# Patient Record
Sex: FEMALE | Race: ASIAN | Employment: STUDENT | URBAN - METROPOLITAN AREA
[De-identification: names, ages, dates, MRNs, and addresses within clinical notes are randomized per-mention and may not be internally consistent; named-entity substitution may affect disease eponyms.]

---
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
## 2024-01-12 DIAGNOSIS — F32.0 CURRENT MILD EPISODE OF MAJOR DEPRESSIVE DISORDER WITHOUT PRIOR EPISODE (HCC): ICD-10-CM

## 2024-01-12 DIAGNOSIS — F41.1 GAD (GENERALIZED ANXIETY DISORDER): ICD-10-CM

## 2024-01-12 NOTE — TELEPHONE ENCOUNTER
Requested Prescriptions     Pending Prescriptions Disp Refills    ESCITALOPRAM 10 MG Oral Tab [Pharmacy Med Name: ESCITALOPRAM 10MG TABLETS] 90 tablet 0     Sig: TAKE 1 TABLET(10 MG) BY MOUTH DAILY    ESCITALOPRAM 5 MG Oral Tab [Pharmacy Med Name: ESCITALOPRAM 5MG TABLETS] 30 tablet 0     Sig: TAKE 1 TABLET(5 MG) BY MOUTH DAILY     Last refill 12/18/23 #30 for 5mg tabs  Escitalopram 10mg discontinued on 10/30/2023    LOV 8/10/23  Per LOV note:  2. Current mild episode of major depressive disorder without prior episode (HCC)  - denies SI/HI  - doing well on current med  - will cont escitalopram 10mg po daily, refills for 3mo provided  - monitor symptoms  - escitalopram 10 MG Oral Tab; Take 1 tablet (10 mg total) by mouth daily.  Dispense: 90 tablet; Refill: 0         No future appointments.    [facility] sent to patient clarifying which dose she has been on.

## 2024-01-15 NOTE — TELEPHONE ENCOUNTER
Name from pharmacy: ESCITALOPRAM 5MG TABLETS         Will file in chart as: ESCITALOPRAM 5 MG Oral Tab    Sig: TAKE 1 TABLET(5 MG) BY MOUTH DAILY    Disp: 30 tablet    Refills: 0 (Pharmacy requested: Not specified)    Start: 1/12/2024    Class: Normal     LOV  8/10/23     LAST LAB n/a     LAST RX 12/18/23 30    Next OV No future appointments.      PROTOCOL none

## 2024-01-18 RX ORDER — ESCITALOPRAM OXALATE 5 MG/1
5 TABLET ORAL DAILY
Qty: 30 TABLET | Refills: 0 | OUTPATIENT
Start: 2024-01-18

## 2024-01-18 RX ORDER — ESCITALOPRAM OXALATE 10 MG/1
10 TABLET ORAL DAILY
Qty: 90 TABLET | Refills: 0 | Status: SHIPPED | OUTPATIENT
Start: 2024-01-18

## 2024-01-26 ENCOUNTER — PATIENT MESSAGE (OUTPATIENT)
Dept: FAMILY MEDICINE CLINIC | Facility: CLINIC | Age: 19
End: 2024-01-26

## 2024-01-26 NOTE — TELEPHONE ENCOUNTER
From: Yaw Ramos  To: Penelope Aguilar  Sent: 1/26/2024 8:24 AM CST  Subject: Switching Primary     Good morning! Yaw is switching provider from Dr Reese to aicha. Thank you    Ana

## 2024-02-22 DIAGNOSIS — F41.1 GAD (GENERALIZED ANXIETY DISORDER): ICD-10-CM

## 2024-02-22 RX ORDER — DULOXETIN HYDROCHLORIDE 60 MG/1
60 CAPSULE, DELAYED RELEASE ORAL DAILY
Qty: 30 CAPSULE | Refills: 0 | Status: SHIPPED | OUTPATIENT
Start: 2024-02-22

## 2024-02-22 NOTE — TELEPHONE ENCOUNTER
LOV 1/26/2024     LAST LAB     LAST RX   DULoxetine 60 MG Oral Cap DR Particles 30 capsule 0 1/26/2024 --   Sig:   Take 1 capsule (60 mg total) by mouth daily.         Next OV No future appointments.     PROTOCOL     Psychiatric Non-Scheduled (Anti-Anxiety) [id number]/22/2024 12:42 PM   Protocol Details In person appointment or virtual visit in the past 6 mos or appointment in next 3 mos    Depression Screening completed within the past 12 months

## 2024-02-22 NOTE — TELEPHONE ENCOUNTER
Return in about 6 weeks (around 3/8/2024) for F/U Mental Health. [facility] sent

## 2024-04-22 DIAGNOSIS — F41.1 GAD (GENERALIZED ANXIETY DISORDER): ICD-10-CM

## 2024-04-22 DIAGNOSIS — F32.0 CURRENT MILD EPISODE OF MAJOR DEPRESSIVE DISORDER WITHOUT PRIOR EPISODE (HCC): ICD-10-CM

## 2024-04-23 RX ORDER — ESCITALOPRAM OXALATE 10 MG/1
10 TABLET ORAL DAILY
Qty: 90 TABLET | Refills: 0 | OUTPATIENT
Start: 2024-04-23

## 2024-04-23 RX ORDER — HYDROXYZINE HYDROCHLORIDE 25 MG/1
25 TABLET, FILM COATED ORAL AS NEEDED
Qty: 30 TABLET | Refills: 1 | Status: SHIPPED | OUTPATIENT
Start: 2024-04-23

## 2024-04-23 NOTE — TELEPHONE ENCOUNTER
LOV 3/5/2024     LAST LAB    LAST RX     Next OV No future appointments.     PROTOCOL     Psychiatric Non-Scheduled (Anti-Anxiety) [id number]/22/2024 05:56 AM   Protocol Details In person appointment or virtual visit in the past 6 mos or appointment in next 3 mos    Depression Screening completed within the past 12 months

## 2024-04-23 NOTE — TELEPHONE ENCOUNTER
LOV 3/5/2024     LAST LAB    LAST RX   Medication Quantity Refills Start End   hydrOXYzine 25 MG Oral Tab 30 tablet 1 1/26/2024 --   Sig:   Take 1 tablet (25 mg total) by mouth as needed for Anxiety.     Route:   Oral         Next OV No future appointments.     PROTOCOL NONE

## 2024-04-23 NOTE — TELEPHONE ENCOUNTER
Unclear why request is coming to us. I dont have any notes regarding change in her medications. May need to request through Psych

## 2024-06-04 ENCOUNTER — APPOINTMENT (OUTPATIENT)
Dept: GENERAL RADIOLOGY | Age: 19
End: 2024-06-04
Attending: EMERGENCY MEDICINE
Payer: COMMERCIAL

## 2024-06-04 ENCOUNTER — HOSPITAL ENCOUNTER (OUTPATIENT)
Age: 19
Discharge: HOME | End: 2024-06-04
Attending: EMERGENCY MEDICINE
Payer: COMMERCIAL

## 2024-06-04 VITALS
DIASTOLIC BLOOD PRESSURE: 80 MMHG | OXYGEN SATURATION: 99 % | HEIGHT: 64 IN | WEIGHT: 118 LBS | BODY MASS INDEX: 20.14 KG/M2 | RESPIRATION RATE: 18 BRPM | HEART RATE: 110 BPM | SYSTOLIC BLOOD PRESSURE: 123 MMHG | TEMPERATURE: 99 F

## 2024-06-04 DIAGNOSIS — S56.911A STRAIN OF RIGHT FOREARM, INITIAL ENCOUNTER: Primary | ICD-10-CM

## 2024-06-04 PROCEDURE — 99213 OFFICE O/P EST LOW 20 MIN: CPT

## 2024-06-04 PROCEDURE — 99214 OFFICE O/P EST MOD 30 MIN: CPT

## 2024-06-04 PROCEDURE — 73080 X-RAY EXAM OF ELBOW: CPT | Performed by: EMERGENCY MEDICINE

## 2024-06-04 PROCEDURE — 73090 X-RAY EXAM OF FOREARM: CPT | Performed by: EMERGENCY MEDICINE

## 2024-06-04 NOTE — DISCHARGE INSTRUCTIONS
Follow-up with your orthopedic doctor in the next 1 to 2 weeks if pain persist.  Return for any concerning symptoms.

## 2024-06-04 NOTE — ED PROVIDER NOTES
Patient Seen in: [facility]      History     Chief Complaint   Patient presents with    Arm or Hand Injury     Stated Complaint: fell on arm yesterday    Subjective:   HPI    18-year-old female presents today for evaluation following a fall.  In the evening, patient was walking and tripped over her dog.  She fell on her hyperextended right arm.  This morning, she noted a right proximal forearm pain.  She denies any head injury or other pain.    Objective:   Past Medical History:    Anxiety    Asthma (HCC)    Depression    Stress fracture    right foot              History reviewed. No pertinent surgical history.             Social History     Socioeconomic History    Marital status: Single   Tobacco Use    Smoking status: Never    Smokeless tobacco: Never   Vaping Use    Vaping status: Former   Substance and Sexual Activity    Alcohol use: Yes     Alcohol/week: 2.0 standard drinks of alcohol     Types: 2 Standard drinks or equivalent per week    Drug use: Never    Sexual activity: Yes     Partners: Male     Birth control/protection: Condom   Other Topics Concern    Caffeine Concern Yes     Comment: coffee -  one cup per week    Exercise Yes     Comment: walking - 30mi per day    Seat Belt Yes              Review of Systems    Positive for stated complaint: fell on arm yesterday  Other systems are as noted in HPI.  Constitutional and vital signs reviewed.      All other systems reviewed and negative except as noted above.    Physical Exam     ED Triage Vitals [06/04/24 0841]   /80   Pulse 110   Resp 18   Temp 98.6 °F (37 °C)   Temp src Temporal   SpO2 99 %   O2 Device None (Room air)       Current Vitals:   Vital Signs  BP: 123/80  Pulse: 110  Resp: 18  Temp: 98.6 °F (37 °C)  Temp src: Temporal    Oxygen Therapy  SpO2: 99 %  O2 Device: None (Room air)            Physical Exam  Vitals and nursing note reviewed.   Constitutional:       Appearance: Normal appearance.   HENT:      Head:  Normocephalic.      Nose: Nose normal.      Mouth/Throat:      Mouth: Mucous membranes are moist.   Eyes:      Extraocular Movements: Extraocular movements intact.   Cardiovascular:      Rate and Rhythm: Normal rate.   Pulmonary:      Effort: Pulmonary effort is normal.   Abdominal:      General: Abdomen is flat.   Musculoskeletal:         General: No swelling or deformity. Normal range of motion.      Comments: Palpable right radial pulse.  Normal hand/wrist ulnar, radial and median nerve strength.  Gross sensation intact.  No tenderness over the olecranon.  No deformity   Skin:     General: Skin is warm.   Neurological:      General: No focal deficit present.      Mental Status: She is alert.   Psychiatric:         Mood and Affect: Mood normal.            ED Course   Labs Reviewed - No data to display          XR ELBOW, COMPLETE (MIN 3 VIEWS), RIGHT (CPT=73080)    Result Date: 6/4/2024  PROCEDURE:  XR ELBOW, COMPLETE (MIN 3 VIEWS), RIGHT (CPT=73080)  TECHNIQUE:  Three views were obtained.  COMPARISON:  None.  INDICATIONS:  fell on arm yesterday  PATIENT STATED HISTORY: (As transcribed by Technologist)  Patient c/o right lateral elbow pain since early this AM. Patient stated she tripped over her dog and used right arm to break the fall.    FINDINGS:  No acute fractures or osseous lesions are identified.  Radial capitellar relationship is within normal limits.  No significant elbow joint effusion            CONCLUSION:  No acute fractures.   LOCATION:  [facility]    Dictated by (CST): Paula Ruiz MD on 6/04/2024 at 10:05 AM     Finalized by (CST): Paula Ruiz MD on 6/04/2024 at 10:05 AM       XR FOREARM (2 VIEWS), RIGHT (CPT=73090)    Result Date: 6/4/2024  PROCEDURE:  XR FOREARM (2 VIEWS), RIGHT (CPT=73090)  TECHNIQUE:  Two views were obtained.  COMPARISON:  None.  INDICATIONS:  fell on arm yesterday  PATIENT STATED HISTORY: (As transcribed by Technologist)  Patient c/o right lateral forearm pain since early  this AM. Patient stated she tripped over her dog and used right arm to break the fall.    FINDINGS:  No acute fractures or osseous lesions are identified.  No significant elbow joint effusion.  Radial carpal and Radial capitellar relationships are within normal limits.  No scapholunate widening.             CONCLUSION:  No acute fractures.   LOCATION:  [facility]   Dictated by (CST): Paula Ruiz MD on 6/04/2024 at 10:04 AM     Finalized by (CST): Paula Ruiz MD on 6/04/2024 at 10:04 AM       I personally reviewed the images myself and I agree with the radiology assessment       MDM      Differential Diagnosis  18-year-old female presents today for discomfort over the right proximal forearm.  Her right upper extremity is neurovascularly intact.  Her forearm is soft.  She does have some discomfort over the radial head.  Differential would include strain, contusion or fracture.  Will obtain x-ray and reassess.    10:08 am  X-ray does not reveal acute bony abnormality.  I counseled patient on care for home and advised Ortho follow-up for any persistent pain.  Patient feels comfortable plan, jean DC.                                 Medical Decision Making      Disposition and Plan     Clinical Impression:  1. Strain of right forearm, initial encounter         Disposition:  Discharge  6/4/2024 10:09 am    Follow-up:  Ernie Barahona MD  [address] [place] [id number] [phone]    Call today            Medications Prescribed:  Current Discharge Medication List

## 2024-06-04 NOTE — ED INITIAL ASSESSMENT (HPI)
Patient presents to IC with c/o right elbow/forearm pain after she trip/fell over her dog last night.  Denies head injury.

## 2024-07-16 DIAGNOSIS — F41.1 GAD (GENERALIZED ANXIETY DISORDER): ICD-10-CM

## 2024-07-16 RX ORDER — DULOXETIN HYDROCHLORIDE 60 MG/1
60 CAPSULE, DELAYED RELEASE ORAL DAILY
Qty: 90 CAPSULE | Refills: 0 | Status: CANCELLED | OUTPATIENT
Start: 2024-07-16

## 2024-07-18 ENCOUNTER — PATIENT MESSAGE (OUTPATIENT)
Dept: FAMILY MEDICINE CLINIC | Facility: CLINIC | Age: 19
End: 2024-07-18

## 2024-07-18 DIAGNOSIS — F41.1 GAD (GENERALIZED ANXIETY DISORDER): ICD-10-CM

## 2024-07-18 RX ORDER — DULOXETIN HYDROCHLORIDE 60 MG/1
60 CAPSULE, DELAYED RELEASE ORAL DAILY
Qty: 90 CAPSULE | Refills: 0 | OUTPATIENT
Start: 2024-07-18

## 2024-07-18 RX ORDER — DULOXETIN HYDROCHLORIDE 60 MG/1
60 CAPSULE, DELAYED RELEASE ORAL DAILY
Qty: 30 CAPSULE | Refills: 0 | Status: SHIPPED | OUTPATIENT
Start: 2024-07-18

## 2024-07-18 RX ORDER — HYDROXYZINE HYDROCHLORIDE 25 MG/1
25 TABLET, FILM COATED ORAL AS NEEDED
Qty: 30 TABLET | Refills: 1 | Status: SHIPPED | OUTPATIENT
Start: 2024-07-18

## 2024-07-18 NOTE — TELEPHONE ENCOUNTER
From: Yaw Ramos  To: Penelope Aguilar  Sent: 7/18/2024 9:39 AM CDT  Subject: Prescription     Can u please refill Yaw prescriptions? Ty

## 2024-07-18 NOTE — TELEPHONE ENCOUNTER
Please review.  Protocol failed / Has no protocol.     Requested Prescriptions   Pending Prescriptions Disp Refills    hydrOXYzine 25 MG Oral Tab 30 tablet 1     Sig: Take 1 tablet (25 mg total) by mouth as needed for Anxiety.       There is no refill protocol information for this order        Future Appointments         Provider Department Appt Notes    In 1 week Penelope Aguilar DO [facility], [address] [place] [address] Follow up appt          Recent Outpatient Visits              4 months ago FILOMENA (generalized anxiety disorder)    [facility], [address] [place] [address] Penelope Aguilar,     Office Visit    5 months ago FILOMENA (generalized anxiety disorder)    [facility], [address] [place] [address] Penelope Aguilar,     Office Visit    7 months ago UTI symptoms    [facility], [facility], [address] [place] [address] Carrie Naik PA-C    Office Visit    11 months ago FILOMENA (generalized anxiety disorder)    [facility], [address] [place] [address] Clemencia Cochran,     Office Visit    1 year ago FILOMENA (generalized anxiety disorder)    [facility], [address] [place] [address] Clemencia Cochran, DO    Office Visit

## 2024-07-18 NOTE — TELEPHONE ENCOUNTER
Spoke to pt -requesting Rx Duloxetine .    Future Appointments   Date Time Provider Department Center   7/30/2024  3:00 PM Penelope Aguilar, DO [facility]      [facility] 3/5/2024     Lrx;   Medication Quantity Refills Start End   DULoxetine 60 MG Oral Cap DR Particles 90 capsule 0 3/5/2024 --   Sig:   Take 1 capsule (60 mg total) by mouth daily.        Rx pended Please call patient needs appointment. Please approve if appropriate. Thanks.

## 2024-07-18 NOTE — TELEPHONE ENCOUNTER
Duplicate request, previously addressed.    See encounter 7/18/24 patient message - from proxy Cade Ramos.

## 2024-07-30 ENCOUNTER — OFFICE VISIT (OUTPATIENT)
Dept: FAMILY MEDICINE CLINIC | Facility: CLINIC | Age: 19
End: 2024-07-30
Payer: COMMERCIAL

## 2024-07-30 VITALS
DIASTOLIC BLOOD PRESSURE: 60 MMHG | HEIGHT: 64 IN | OXYGEN SATURATION: 97 % | HEART RATE: 110 BPM | TEMPERATURE: 97 F | BODY MASS INDEX: 19.46 KG/M2 | SYSTOLIC BLOOD PRESSURE: 94 MMHG | RESPIRATION RATE: 16 BRPM | WEIGHT: 114 LBS

## 2024-07-30 DIAGNOSIS — F41.1 GAD (GENERALIZED ANXIETY DISORDER): ICD-10-CM

## 2024-07-30 DIAGNOSIS — Z00.00 ENCOUNTER FOR GENERAL ADULT MEDICAL EXAMINATION WITHOUT ABNORMAL FINDINGS: Primary | ICD-10-CM

## 2024-07-30 PROCEDURE — 99395 PREV VISIT EST AGE 18-39: CPT | Performed by: FAMILY MEDICINE

## 2024-07-30 RX ORDER — DULOXETIN HYDROCHLORIDE 60 MG/1
60 CAPSULE, DELAYED RELEASE ORAL DAILY
Qty: 90 CAPSULE | Refills: 1 | Status: SHIPPED | OUTPATIENT
Start: 2024-07-30

## 2024-07-30 NOTE — PROGRESS NOTES
Family Medicine Progress Note  Assessment & Plan:   Yaw Ramos is a 18 year old female who is here for:   1. Encounter for general adult medical examination without abnormal findings  Wellness Exam done today and routine Preventative Care discussed as noted below.   -Pap smear: - @ 21yo   -G&C testing: declined  -Contraception:  none   -Healthy eating habits and regular exercise encouraged     2. FILOMENA (generalized anxiety disorder)- Patient describes mood as Euthymic.  Meds controlling symptoms based on GAD7.   -Contin Cymbalta 60mg   -CBT/Counseling: weekly   -Aware of importance of adequate sleep, nutrition, and physical activity.   -Aware of ER Precautions for SI/HI.  Ant to Return to clinic if develop worsening symptoms or adverse effects from RX therapy.    - DULoxetine 60 MG Oral Cap DR Particles; Take 1 capsule (60 mg total) by mouth daily.  Dispense: 90 capsule; Refill: 0    Follow-Up: Return in about 6 months (around 1/30/2025) for F/U Mental Health.      Penelope Aguilar,    07/30/24       CC: Anxiety, Depression (Follow up), and Physical    Subjective:    History of Present Illness:  History obtained from patient.     Yaw Ramos is a 18 year old female who presents for Anxiety, Depression (Follow up), and Physical     FILOMENA/MDD- chronic issue since early teens;  has been on Lexapro consistently since at least 18yo.  Last FU with PCM in Aug was on lexapro 10mg.  Was doing well, recently went back to [facility] ([employer]) and had a panic attack which she could not get out of.  When she has episodes of increased anxiety she does have passive SI but no intent or plan.  Was not taking her medication.  Also had not been able to consistently follow-up with therapist when she was away at school. She was then seen in ER and addmited for IP for 2 night 1/20-1/22. Came home this week.  Psychiatrist titrated her down off the Lexparo and started her on Duloxetine with goal of being up to 60mg by next week.  Since starting  the medication she has had some nausea and decreased appetite but overall doing well;  Has  Well[place] in [place].  1/wk as needed.   3/5- Pt is home on spring break; she has since titrated up to the 60mg and doing well.  No AE other than some decreased appetite; has been trying to focus on eating..  Feels her anxiety is under control.  Much more manageable. Hasn't had panic and really hasn't needed the hydroxyzine.   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     ANNUAL PHYSICAL  Menses: Regular without any break through bleeding or concerning symptoms.   LMP: Patient's last menstrual period was 2024.  Concern for STI: Denies   Contraception:  None  Exercise: generally tries to be active-- lifeguarding   Healthy eating habits:  Well-rounded  Tobacco: denies   Alcohol- occ.   Immunizations: up-to-date        FILOMENA-Duloxetine   Asthma   Pshx: None   All:  Fish oil, Peanuts, Tree Nuts. Shellfish   Meds: as below  Fam hx: DM-MGM; HTN-MGM, PGM   Soc hx: no tob/occ etoh/no illicits; [employer]  Ob/gyne: Patient's last menstrual period was 2024.. last pap: -, last mammogram: -,  ,   Colonoscopy: -        [facility] AMB FILOMENA-7 1/26 3/5 7/30   Feeling nervous, anxious, or on edge 3 0 1   Not being able to stop or control worrying 3 1 1   Worrying too much about different things  3 1 1   Trouble relaxing 3 0 0   Being so restless that it's hard to sit still 0 0 0   Becoming easily annoyed or irritable 3 0 0   Feeling afraid as if something awful might happen 3 0 0   FILOMENA 7 Total Score 18 2 3   How difficult has it made it for you to do your work, take care of things at home, get along with others? v [facility]       Depression Screenings (All PHQ) 1/26 3/5 7/30   Little interest or pleasure in doing things 1 1 0   Feeling down, depressed, or  hopeless 1 0 0   Trouble falling/staying asleep, or sleeping too much 3 2 2   Feeling tired or having little energy 3 0 1   Poor appetite or overeating 3 3 1   Feeling bad about yourself 2 1 1   Trouble concentrating  2 1 0   Moving or speaking slowly Or  fidgety/restless  0 0 0   Th you would be better off dead/harming yourself 0 0 0   PHQ-9 TOTAL SCORE 15 8 5   How difficult has it made it for you to do your work, take care of things at home, get along with others? SW sw SW            History/Other:   ROS-Per HPI     Problem List:  Patient Active Problem List   Diagnosis    Allergy to other foods       Current Medications:  Current Outpatient Medications   Medication Sig Dispense Refill    DULoxetine 60 MG Oral Cap DR Particles Take 1 capsule (60 mg total) by mouth daily. 90 capsule 1    hydrOXYzine 25 MG Oral Tab Take 1 tablet (25 mg total) by mouth as needed for Anxiety. 30 tablet 1    Cholecalciferol (VITAMIN D3) 250 MCG (42395 UT) Oral Cap Take by mouth. (Patient not taking: Reported on 7/30/2024)        Past Medical History:  Past Medical History:    Anxiety    Asthma (HCC)    Depression    Stress fracture    right foot      Past Surgical History:  History reviewed. No pertinent surgical history.   Family History:  Family History   Problem Relation Age of Onset    Hypertension Maternal Grandmother     Diabetes Maternal Grandmother     Hypertension Paternal Grandmother     Diabetes Paternal Grandmother     Other (Other) Paternal Grandfather         gout      Social History:  Social History     Socioeconomic History    Marital status: Single   Tobacco Use    Smoking status: Never    Smokeless tobacco: Never   Vaping Use    Vaping status: Former   Substance and Sexual Activity    Alcohol use: Yes     Alcohol/week: 2.0 standard drinks of alcohol     Types: 2 Standard drinks or equivalent per week    Drug use: Never    Sexual activity: Yes     Partners: Male     Birth control/protection: Condom   Other Topics  Concern    Caffeine Concern Yes     Comment: coffee -  one cup per week    Exercise Yes     Comment: walking - 30mi per day    Seat Belt Yes       Allergies:  Allergies   Allergen Reactions    Fish Oil OTHER (SEE COMMENTS)    Peanuts [Peanut Oil] RASH     Unknown, possibly rash    Shellfish-Derived Products SWELLING    Tree Nuts UNKNOWN        Objective:    VITALS: BP 94/60   Pulse 110   Temp 96.7 °F (35.9 °C) (Temporal)   Resp 16   Ht 5' 4\" (1.626 m)   Wt 114 lb (51.7 kg)   LMP 07/23/2024   SpO2 97%   BMI 19.57 kg/m²      BP Readings from Last 3 Encounters:   07/30/24 94/60   06/04/24 123/80   03/05/24 96/60     Wt Readings from Last 3 Encounters:   07/30/24 114 lb (51.7 kg) (22%, Z= -0.76)*   06/04/24 118 lb (53.5 kg) (31%, Z= -0.51)*   03/05/24 114 lb (51.7 kg) (23%, Z= -0.73)*     * Growth percentiles are based on CDC (Girls, 2-20 Years) data.         PHYSICAL EXAM  GEN: pleasant, well-appearing in NAD, AOX3  SKIN: no visible rashes, lesions, or evidence of trauma  HEENT: PERRL, EOMI, moist mucous membranes, oropharynx clear, TM clear, nares patent, no Thyromegaly or nodule.   CV: RRR, no murmurs or abnl heart sounds   PULM: Clear to auscultation, No wheezes, rales, rhonchi.  Non-labored breathing.  ABD: Soft, non-tender, non-distended, + BS, no rigidity/guarding  EXT:  Warm, well perfused, no lower extremity edema  NEURO: CNs grossly intact, no focal weakness  MSK: moves all 4 extremities without difficulty    MENTAL STATUS EXAM  Appearance: groomed appropriately, makes good eye contact  Attitude: cooperative  Motor: No psychomotor agitation/depression   Speech: normal rate and rhythm   Mood: euthymic   Affect:  mood congruent  Form of Thought:  Fluid  Thought Content: No SI/HI   Perception: No hallucinations or delusions  Judgement and Insight- Intact           Penelope Aguilar DO

## 2024-09-19 DIAGNOSIS — F41.1 GAD (GENERALIZED ANXIETY DISORDER): ICD-10-CM

## 2024-09-24 RX ORDER — HYDROXYZINE HYDROCHLORIDE 25 MG/1
25 TABLET, FILM COATED ORAL AS NEEDED
Qty: 30 TABLET | Refills: 1 | Status: SHIPPED | OUTPATIENT
Start: 2024-09-24

## 2024-09-24 NOTE — TELEPHONE ENCOUNTER
Please review. Protocol Failed; No Protocol    Requested Prescriptions   Pending Prescriptions Disp Refills    hydrOXYzine 25 MG Oral Tab 30 tablet 1     Sig: Take 1 tablet (25 mg total) by mouth as needed for Anxiety.       There is no refill protocol information for this order              Recent Outpatient Visits              1 month ago Encounter for general adult medical examination without abnormal findings    [facility], [address] [place] [address] Penelope Aguilar,     Office Visit    6 months ago FILOMENA (generalized anxiety disorder)    [facility], [address] [place] [address] Penelope Aguilar,     Office Visit    8 months ago FILOMENA (generalized anxiety disorder)    [facility], [address] [place] [address] Penelope Aguilar,     Office Visit    10 months ago UTI symptoms    [facility], [facility], [address], [place] Carrie Naik PA-C    Office Visit    1 year ago FILOMENA (generalized anxiety disorder)    [facility], [address] [place] [address] Clemencia Cochran, DO    Office Visit

## 2025-03-07 DIAGNOSIS — F41.1 GAD (GENERALIZED ANXIETY DISORDER): ICD-10-CM

## 2025-03-10 ENCOUNTER — TELEPHONE (OUTPATIENT)
Dept: FAMILY MEDICINE CLINIC | Facility: CLINIC | Age: 20
End: 2025-03-10

## 2025-03-10 ENCOUNTER — OFFICE VISIT (OUTPATIENT)
Dept: FAMILY MEDICINE CLINIC | Facility: CLINIC | Age: 20
End: 2025-03-10
Payer: COMMERCIAL

## 2025-03-10 ENCOUNTER — LAB ENCOUNTER (OUTPATIENT)
Dept: LAB | Age: 20
End: 2025-03-10
Attending: FAMILY MEDICINE
Payer: COMMERCIAL

## 2025-03-10 VITALS
RESPIRATION RATE: 14 BRPM | HEART RATE: 110 BPM | OXYGEN SATURATION: 100 % | WEIGHT: 123 LBS | HEIGHT: 64 IN | DIASTOLIC BLOOD PRESSURE: 60 MMHG | BODY MASS INDEX: 21 KG/M2 | SYSTOLIC BLOOD PRESSURE: 100 MMHG | TEMPERATURE: 98 F

## 2025-03-10 DIAGNOSIS — R50.9 FEVER, UNSPECIFIED FEVER CAUSE: ICD-10-CM

## 2025-03-10 DIAGNOSIS — R50.9 FEVER, UNSPECIFIED FEVER CAUSE: Primary | ICD-10-CM

## 2025-03-10 DIAGNOSIS — F41.1 GAD (GENERALIZED ANXIETY DISORDER): ICD-10-CM

## 2025-03-10 DIAGNOSIS — R59.0 CERVICAL LYMPHADENOPATHY: ICD-10-CM

## 2025-03-10 LAB
ALBUMIN SERPL-MCNC: 4.3 G/DL (ref 3.2–4.8)
ALBUMIN/GLOB SERPL: 1.3 {RATIO} (ref 1–2)
ALP LIVER SERPL-CCNC: 55 U/L
ALT SERPL-CCNC: 14 U/L
ANION GAP SERPL CALC-SCNC: 7 MMOL/L (ref 0–18)
AST SERPL-CCNC: 23 U/L (ref ?–34)
BASOPHILS # BLD AUTO: 0.04 X10(3) UL (ref 0–0.2)
BASOPHILS NFR BLD AUTO: 0.5 %
BILIRUB SERPL-MCNC: 0.2 MG/DL (ref 0.3–1.2)
BUN BLD-MCNC: 6 MG/DL (ref 9–23)
CALCIUM BLD-MCNC: 8.8 MG/DL (ref 8.7–10.6)
CHLORIDE SERPL-SCNC: 103 MMOL/L (ref 98–112)
CO2 SERPL-SCNC: 29 MMOL/L (ref 21–32)
CREAT BLD-MCNC: 0.82 MG/DL
EGFRCR SERPLBLD CKD-EPI 2021: 105 ML/MIN/1.73M2 (ref 60–?)
EOSINOPHIL # BLD AUTO: 0.16 X10(3) UL (ref 0–0.7)
EOSINOPHIL NFR BLD AUTO: 2.1 %
ERYTHROCYTE [DISTWIDTH] IN BLOOD BY AUTOMATED COUNT: 15.9 %
FASTING STATUS PATIENT QL REPORTED: NO
GLOBULIN PLAS-MCNC: 3.2 G/DL (ref 2–3.5)
GLUCOSE BLD-MCNC: 63 MG/DL (ref 70–99)
HCT VFR BLD AUTO: 36.6 %
HETEROPH AB SER QL: NEGATIVE
HGB BLD-MCNC: 11.7 G/DL
IMM GRANULOCYTES # BLD AUTO: 0.02 X10(3) UL (ref 0–1)
IMM GRANULOCYTES NFR BLD: 0.3 %
LYMPHOCYTES # BLD AUTO: 1.17 X10(3) UL (ref 1–4)
LYMPHOCYTES NFR BLD AUTO: 15.3 %
MCH RBC QN AUTO: 25.7 PG (ref 26–34)
MCHC RBC AUTO-ENTMCNC: 32 G/DL (ref 31–37)
MCV RBC AUTO: 80.4 FL
MONOCYTES # BLD AUTO: 0.94 X10(3) UL (ref 0.1–1)
MONOCYTES NFR BLD AUTO: 12.3 %
NEUTROPHILS # BLD AUTO: 5.3 X10 (3) UL (ref 1.5–7.7)
NEUTROPHILS # BLD AUTO: 5.3 X10(3) UL (ref 1.5–7.7)
NEUTROPHILS NFR BLD AUTO: 69.5 %
OSMOLALITY SERPL CALC.SUM OF ELEC: 284 MOSM/KG (ref 275–295)
PLATELET # BLD AUTO: 353 10(3)UL (ref 150–450)
POTASSIUM SERPL-SCNC: 3.5 MMOL/L (ref 3.5–5.1)
PROT SERPL-MCNC: 7.5 G/DL (ref 5.7–8.2)
RBC # BLD AUTO: 4.55 X10(6)UL
SODIUM SERPL-SCNC: 139 MMOL/L (ref 136–145)
WBC # BLD AUTO: 7.6 X10(3) UL (ref 4–11)

## 2025-03-10 PROCEDURE — 86403 PARTICLE AGGLUT ANTBDY SCRN: CPT

## 2025-03-10 PROCEDURE — 36415 COLL VENOUS BLD VENIPUNCTURE: CPT

## 2025-03-10 PROCEDURE — 99214 OFFICE O/P EST MOD 30 MIN: CPT | Performed by: FAMILY MEDICINE

## 2025-03-10 PROCEDURE — 85025 COMPLETE CBC W/AUTO DIFF WBC: CPT

## 2025-03-10 PROCEDURE — 80053 COMPREHEN METABOLIC PANEL: CPT

## 2025-03-10 RX ORDER — DULOXETIN HYDROCHLORIDE 60 MG/1
60 CAPSULE, DELAYED RELEASE ORAL DAILY
Qty: 90 CAPSULE | Refills: 1 | Status: SHIPPED | OUTPATIENT
Start: 2025-03-10

## 2025-03-10 RX ORDER — NORETHINDRONE ACETATE AND ETHINYL ESTRADIOL AND FERROUS FUMARATE 1MG-20(21)
KIT ORAL
COMMUNITY
Start: 2025-02-13

## 2025-03-10 NOTE — PROGRESS NOTES
The following individual(s) verbally consented to be recorded using ambient AI listening technology and understand that they can each withdraw their consent to this listening technology at any point by asking the clinician to turn off or pause the recording:    Patient name: Yaw Ramos   Additional names:

## 2025-03-10 NOTE — TELEPHONE ENCOUNTER
Patient scheduled the following thru MY Chart.  Triage if needed.        Appointment for: Yaw Ramos ([id number])   Visit type: MYCHART EXAM (2964)   3/10/2025 1:20 PM (20 minutes) with Penelope Aguilar in [facility] [address] [place] [address]      Patient comments:   On and off fever

## 2025-03-10 NOTE — PROGRESS NOTES
Family Medicine Progress Note  Assessment & Plan:   Follow-Up: Return if symptoms worsen or fail to improve.        Assessment & Plan  Fever, unspecified fever cause  Persistent symptoms of fever, chills, cough, and congestion for the past month. Possible sinus infection or mononucleosis given the chronicity of symptoms and physical exam findings of nasal congestion, Sinus pressure, throat pain/fatigue.  Lungs are clear.  + Cervical LAD   -Order labs to check for mononucleosis and other potential causes.  -Prescribe Augmentin to cover possible sinus infection.  -Advise rest and adequate hydration.  Orders:    CBC With Differential With Platelet; Future; Expected date: 03/10/2025    Comp Metabolic Panel (14); Future; Expected date: 03/10/2025    Mononucleosis, Qual; Future; Expected date: 03/10/2025    Amoxicillin-Pot Clavulanate; Take 1 tablet by mouth 2 (two) times daily for 10 days.  Dispense: 20 tablet; Refill: 0    Cervical lymphadenopathy  TX as above/   FILOMENA (generalized anxiety disorder)  Patient describes mood as Euthymic.  Meds controlling symptoms based on GAD7.   -Contin Cymbalta 60mg   -CBT/Counseling: weekly   -Aware of importance of adequate sleep, nutrition, and physical activity.   -Aware of ER Precautions for SI/HI.  Ant to Return to clinic if develop worsening symptoms or adverse effects from RX therapy.    Orders:    DULoxetine HCl; Take 1 capsule (60 mg total) by mouth daily.  Dispense: 90 capsule; Refill: 1       Subjective:    CC: Fever (Chills on and off for a month.)  History of Present Illness:  History obtained from patient.       History of Present Illness  Yaw Ramos is a 19 year old female who presents for Fever (Chills on and off for a month.)     FEVER- She has been experiencing Myalgias, congestion, cough, fever, and chills intermittently over the past month, with periods of improvement followed by recurrence, particularly throughout February. The fever is described as 'hot flashes'  without measured temperature, occurring at night or early morning. No shortness of breath or significant sore throat is present.  The congestion and cough were more severe earlier in the month, with the cough persisting since the initial onset of symptoms.   She mentions being very busy this ester, impacting her ability to rest and recover. She has not had the opportunity to catch up on sleep, contributing to her feeling 'on empty'.         FILOMENA/MDD- chronic issue since early teens;    History of IP stay Freshman year--had a panic attack which she could not get out of.  When she has episodes of increased anxiety she does have passive SI but no intent or plan.  Was not taking her medication. Didn't have consistent follow-up with there therapist.  At that time Lexapro was transitioned to Duloxetine  3/- Pt is home on spring break; she has since titrated up to the 60mg and doing well.  No AE other than some decreased appetite; has been trying to focus on eating..  Feels her anxiety is under control.  Much more manageable. Hasn't had panic and really hasn't needed the hydroxyzine.   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 to [facility].   03/10/25- Overall doing well from Anxiety stand point; on Spring Break.  Needs RF       FILOMENA-Duloxetine   Asthma   Pshx: None   All:  Fish oil, Peanuts, Tree Nuts. Shellfish   Meds: as below  Fam hx: DM-MGM; HTN-MGM, PGM   Soc hx: no tob/occ etoh/no illicits; [place] [employer]   Ob/gyne: Patient's last menstrual period was 2025.. last pap: -, last mammogram: -,  ,   Colonoscopy: -        History/Other:   ROS-Per HPI     Problem List:  Patient Active Problem List   Diagnosis    Allergy to other foods       Current Medications:  Current Outpatient Medications   Medication Sig Dispense Refill    TARINA FE  EQ 1-20 MG-MCG Oral Tab        DULoxetine 60 MG Oral Cap DR Particles Take 1 capsule (60 mg total) by mouth daily. 90 capsule 1    amoxicillin clavulanate 875-125 MG Oral Tab Take 1 tablet by mouth 2 (two) times daily for 10 days. 20 tablet 0    hydrOXYzine 25 MG Oral Tab Take 1 tablet (25 mg total) by mouth as needed for Anxiety. 30 tablet 1    Cholecalciferol (VITAMIN D3) 250 MCG (66553 UT) Oral Cap Take by mouth. (Patient not taking: Reported on 7/30/2024)        Past Medical History:  Past Medical History:    Anxiety    Asthma (HCC)    Depression    Stress fracture    right foot      Past Surgical History:  History reviewed. No pertinent surgical history.   Family History:  Family History   Problem Relation Age of Onset    Hypertension Maternal Grandmother     Diabetes Maternal Grandmother     Hypertension Paternal Grandmother     Diabetes Paternal Grandmother     Other (Other) Paternal Grandfather         gout      Social History:  Social History     Socioeconomic History    Marital status: Single   Tobacco Use    Smoking status: Never    Smokeless tobacco: Never   Vaping Use    Vaping status: Former   Substance and Sexual Activity    Alcohol use: Yes     Alcohol/week: 2.0 standard drinks of alcohol     Types: 2 Standard drinks or equivalent per week    Drug use: Never    Sexual activity: Yes     Partners: Male     Birth control/protection: Condom   Other Topics Concern    Caffeine Concern Yes     Comment: coffee -  one cup per week    Exercise Yes     Comment: walking - 30mi per day    Seat Belt Yes     Social Drivers of Health     Food Insecurity: No Food Insecurity (3/10/2025)    NCSS - Food Insecurity     Worried About Running Out of Food in the Last Year: No     Ran Out of Food in the Last Year: No   Transportation Needs: No Transportation Needs (3/10/2025)    NCSS - Transportation     Lack of Transportation: No   Housing Stability: Not At Risk (3/10/2025)    NCSS - Housing/Utilities     Has Housing: Yes     Worried About Losing  Housing: No     Unable to Get Utilities: No       Allergies:  Allergies   Allergen Reactions    Fish Oil OTHER (SEE COMMENTS)    Peanuts [Peanut Oil] RASH     Unknown, possibly rash    Shellfish-Derived Products SWELLING    Tree Nuts UNKNOWN        Objective:    VITALS: /60   Pulse 110   Temp 98.1 °F (36.7 °C) (Temporal)   Resp 14   Ht 5' 4\" (1.626 m)   Wt 123 lb (55.8 kg)   LMP 03/03/2025   SpO2 100%   BMI 21.11 kg/m²      BP Readings from Last 3 Encounters:   03/10/25 100/60   07/30/24 94/60   06/04/24 123/80     Wt Readings from Last 3 Encounters:   03/10/25 123 lb (55.8 kg)   07/30/24 114 lb (51.7 kg) (22%, Z= -0.76)*   06/04/24 118 lb (53.5 kg) (31%, Z= -0.51)*     * Growth percentiles are based on [facility] (Girls, 2-20 Years) data.         PHYSICAL EXAM  GEN: pleasant, well-appearing in NAD, AOX3   SKIN: no visible rashes, lesions, or evidence of trauma  HEENT: PERRL, EOMI, moist mucous membranes, oropharynx clear, + tonsillar hypertrophy, no exudate.  Cobble stoning in posterior pharynx, nasal turbinates are boggy with mucosal edema RT>>LT , TM clear without injection or effusion.  Rt Anterior Cervical LAD,  sinus tenderness  CV: RRR, no murmurs or abnl heart sounds   PULM: CTA, No wheezes, rales, rhonchi.  Non-labored breathing.  NEURO: CNs grossly intact, no focal weakness  MSK: moves all 4 extremities without difficulty  PSYCH: mood and affect are appropriate     MENTAL STATUS EXAM  Appearance: groomed appropriately, makes good eye contact  Attitude: cooperative  Motor: No psychomotor agitation/depression   Speech: normal rate and rhythm   Mood: euthymic   Affect:  mood congruent  Form of Thought:  Fluid  Thought Content: No SI/HI   Perception: No hallucinations or delusions  Judgement and Insight- Intact      Approximately 33 minutes was spent: preparing to see the patient (reviewing prior tests, office notes, and consultant notes), personally obtaining a history, conducting a physical exam,  counseling the patient on the plan of care, entering appropriate orders, and documenting clinical information in the electronic health record.       Penelope Aguilar DO

## 2025-03-11 RX ORDER — DULOXETIN HYDROCHLORIDE 60 MG/1
60 CAPSULE, DELAYED RELEASE ORAL DAILY
Qty: 90 CAPSULE | Refills: 1 | OUTPATIENT
Start: 2025-03-11

## 2025-03-11 NOTE — TELEPHONE ENCOUNTER
Outpatient Medication Detail     Disp Refills Start End    DULoxetine 60 MG Oral Cap DR Particles 90 capsule 1 3/10/2025 --    Sig - Route: Take 1 capsule (60 mg total) by mouth daily. - Oral    Sent to pharmacy as: DULoxetine HCl 60 MG Oral Capsule Delayed Release Particles (Cymbalta)    E-Prescribing Status: Receipt confirmed by pharmacy (3/10/2025  1:43 PM CDT)      Associated Diagnoses    FILOMENA (generalized anxiety disorder)        Pharmacy    [id number] [place] [address] [address] AT [facility] [address], [phone], [phone]

## 2025-06-08 ENCOUNTER — OFFICE VISIT (OUTPATIENT)
Dept: FAMILY MEDICINE CLINIC | Facility: CLINIC | Age: 20
End: 2025-06-08
Payer: COMMERCIAL

## 2025-06-08 VITALS
SYSTOLIC BLOOD PRESSURE: 108 MMHG | TEMPERATURE: 97 F | HEART RATE: 115 BPM | OXYGEN SATURATION: 97 % | DIASTOLIC BLOOD PRESSURE: 76 MMHG | BODY MASS INDEX: 21.71 KG/M2 | HEIGHT: 64 IN | WEIGHT: 127.19 LBS | RESPIRATION RATE: 16 BRPM

## 2025-06-08 DIAGNOSIS — R39.9 UTI SYMPTOMS: Primary | ICD-10-CM

## 2025-06-08 LAB
CONTROL LINE PRESENT WITH A CLEAR BACKGROUND (YES/NO): YES YES/NO
KIT LOT #: NORMAL NUMERIC

## 2025-06-08 PROCEDURE — 87086 URINE CULTURE/COLONY COUNT: CPT | Performed by: NURSE PRACTITIONER

## 2025-06-08 PROCEDURE — 81025 URINE PREGNANCY TEST: CPT | Performed by: NURSE PRACTITIONER

## 2025-06-08 PROCEDURE — 99213 OFFICE O/P EST LOW 20 MIN: CPT | Performed by: NURSE PRACTITIONER

## 2025-06-08 RX ORDER — NITROFURANTOIN 25; 75 MG/1; MG/1
100 CAPSULE ORAL 2 TIMES DAILY
Qty: 10 CAPSULE | Refills: 0 | Status: SHIPPED | OUTPATIENT
Start: 2025-06-08 | End: 2025-06-13

## 2025-06-08 NOTE — PROGRESS NOTES
Yaw Ramos is a 19 year old female.  HPI:   Patient presents with symptoms of UTI for several days, but worsened the past day.   Complaining of urinary frequency, urgency, dysuria, pos  suprapubic pain,  Denies back pain, fever, hematuria.  Pt has history of UTI in past.  Pt denies any new sexual partners in the past 4 months.  Current Outpatient Medications   Medication Sig Dispense Refill    TARINA FE 1/20 EQ 1-20 MG-MCG Oral Tab       DULoxetine 60 MG Oral Cap DR Particles Take 1 capsule (60 mg total) by mouth daily. 90 capsule 1    hydrOXYzine 25 MG Oral Tab Take 1 tablet (25 mg total) by mouth as needed for Anxiety. 30 tablet 1    Cholecalciferol (VITAMIN D3) 250 MCG (00216 UT) Oral Cap Take by mouth. (Patient not taking: Reported on 7/30/2024)       No current facility-administered medications for this visit.      Past Medical History[1]   Social History:  Short Social Hx on File[2]      REVIEW OF SYSTEMS:   GENERAL HEALTH: no  fever/chills or fatigue  SKIN: denies any unusual skin lesions or rashes  RESPIRATORY: no shortness of breath with exertion  CARDIOVASCULAR: denies chest pain on exertion and palpitations.  GI: no nausea or vomiting  : as above.  NEURO: denies headaches or dizziness.    EXAM:   /76 (BP Location: Left arm, Patient Position: Sitting)   Pulse 115   Temp 97.3 °F (36.3 °C) (Temporal)   Resp 16   Ht 5' 4\" (1.626 m)   Wt 127 lb 3.2 oz (57.7 kg)   LMP 05/29/2025   SpO2 97%   BMI 21.83 kg/m²   GENERAL: well developed, well nourished,in no apparent distress, pleasant pt.  SKIN: no rashes,no suspicious lesions  LUNG: Clear to auscultation bilaterally. No W/R/R.  HEART: RRR, no murmur.  GI: normoactive BS x4, no masses, HSM; no suprapubic tenderness, no CVAT    RESULTS:      Recent Results (from the past 24 hours)   Urine Pregnancy Test    Collection Time: 06/08/25 10:03 AM   Result Value Ref Range    Pregnancy Test, Urine neg     Control Line Present with a clear background  (yes/no) yes Yes/No    Kit Lot # [id number] Numeric    Kit Expiration Date 10/17/26 Date     Pt took azo  ASSESSMENT AND PLAN:   UTI symptoms    Educated on medication  Educated on sending urine culture  Educated on supportive measures: ibuprofen/tylenol, hydration,   Educated on s/s of worsening sx and when to seek higher level of care  Follow up with pcp if not improving    There are no Patient Instructions on file for this visit.  The patient indicates understanding of these issues and agrees to the plan.        [1]   Past Medical History:   Anxiety    Asthma (HCC)    Depression    Stress fracture    right foot   [2]   Social History  Socioeconomic History    Marital status: Single   Tobacco Use    Smoking status: Never    Smokeless tobacco: Never   Vaping Use    Vaping status: Former   Substance and Sexual Activity    Alcohol use: Yes     Alcohol/week: 2.0 standard drinks of alcohol     Types: 2 Standard drinks or equivalent per week    Drug use: Never    Sexual activity: Yes     Partners: Male     Birth control/protection: Condom   Other Topics Concern    Caffeine Concern Yes     Comment: coffee -  one cup per week    Exercise Yes     Comment: walking - 30mi per day    Seat Belt Yes     Social Drivers of Health     Food Insecurity: No Food Insecurity (3/10/2025)    NCSS - Food Insecurity     Worried About Running Out of Food in the Last Year: No     Ran Out of Food in the Last Year: No   Transportation Needs: No Transportation Needs (3/10/2025)    NCSS - Transportation     Lack of Transportation: No   Housing Stability: Not At Risk (3/10/2025)    NCSS - Housing/Utilities     Has Housing: Yes     Worried About Losing Housing: No     Unable to Get Utilities: No

## 2025-08-05 RX ORDER — NORETHINDRONE ACETATE AND ETHINYL ESTRADIOL AND FERROUS FUMARATE 1MG-20(21)
1 KIT ORAL DAILY
Qty: 84 TABLET | Refills: 1 | Status: SHIPPED | OUTPATIENT
Start: 2025-08-05

## 2025-08-06 ENCOUNTER — OFFICE VISIT (OUTPATIENT)
Dept: FAMILY MEDICINE CLINIC | Facility: CLINIC | Age: 20
End: 2025-08-06

## 2025-08-06 VITALS
HEART RATE: 113 BPM | OXYGEN SATURATION: 98 % | WEIGHT: 126.19 LBS | HEIGHT: 64 IN | SYSTOLIC BLOOD PRESSURE: 100 MMHG | DIASTOLIC BLOOD PRESSURE: 64 MMHG | BODY MASS INDEX: 21.54 KG/M2 | RESPIRATION RATE: 18 BRPM | TEMPERATURE: 98 F

## 2025-08-06 DIAGNOSIS — H00.011 HORDEOLUM EXTERNUM OF RIGHT UPPER EYELID: ICD-10-CM

## 2025-08-06 DIAGNOSIS — H00.014 HORDEOLUM EXTERNUM OF LEFT UPPER EYELID: Primary | ICD-10-CM

## 2025-08-06 PROCEDURE — 99213 OFFICE O/P EST LOW 20 MIN: CPT | Performed by: NURSE PRACTITIONER

## (undated) NOTE — MR AVS SNAPSHOT
[facility] Utilities  [address] [facility] [place], [facility] [place] [address]               Thank you for choosing us for your health care visit with Zaynab Brunson DO.   We are glad to serve you and happy to · Ease pain with anesthetic sprays. Aspirin or an aspirin substitute also helps.  Remember, never give aspirin to anyone 25 or younger, or if you are already taking blood thinners.   · For sore throats caused by allergies, try antihistamines to block the al This list is accurate as of: 4/18/17 12:34 PM.  Always use your most recent med list.                albuterol sulfate (2.5 MG/3ML) 0.083% Nebu   Take by nebulization every 6 (six) hours as needed for Wheezing.    Commonly known as:  VENTOLIN           amox

## (undated) NOTE — LETTER
[facility] [employer] of Child Health Examination       Student's Name  Yaw Ramos Birth Date Title                           Date     Signature HEALTH HISTORY          TO BE COMPLETED AND SIGNED BY PARENT/GUARDIAN AND VERIFIED BY HEALTH CARE PROVIDER    ALLERGIES  (Food, drug, insect, other)  Peanuts [Peanut Oil];  Shellfish-Derived Products; Tree Nuts MEDICATION  (List all prescribed or taken on a /60   Pulse 87   Temp 98.7 °F (37.1 °C) (Oral)   Resp 16   Ht 63\"   Wt 103 lb 4 oz   LMP 07/10/2018   SpO2 98%   BMI 18.29 kg/m²     DIABETES SCREENING  BMI>85% age/sex  No And any two of the following:  Family History No    Ethnic Minority  Yes Respiratory Yes                   Diagnosis of Asthma: No Mental Health Yes        Currently Prescribed Asthma Medication:            Quick-relief  medication (e.g. Short Acting Beta Antagonist): No          Controller medication (e.g. inhaled corticostero

## (undated) NOTE — LETTER
Name:  Cassie Park School Year:  5482-9455 Class: Student ID No.:   Address:  [address] [place] [address] Phone:  [phone] (home)  :  14year old   Name Relationship [address] Work Phone Home Phone Mobile Phone   1.  Tito Phelps syndrome, short QT syndrome, Brugada syndrome, or catecholaminergic polymorphic ventricular tachycardia? No   15. Does anyone in your family have a heart problem, pacemaker, or implanted defibrillator? No   16.  Has anyone in your family had unexplained robson 39. Do you have a history of seizure disorder? No   37. Do you have headaches with exercise? No   38. Have you ever had numbness, tingling, or weakness in your arms or legs after being hit or falling? No   39. Have you ever been unable to move your arms / l MEDICAL NORMAL ABNORMAL FINDINGS   Appearance:  Marfan stigmata (kyphoscoliosis, high-arched palate, pectus excavatum,      arachnodactyly, arm span > height, hyperlaxity, myopia, MVP, aortic insufficiency) Yes    Eyes/Ears/Nose/Throat:    · Pupils equal that I/our student will not use performance-enhancing substances as defined in the [facility] Performance-Enhancing Substance Testing Program Protocol.  We have reviewed the policy and understand that I/our student may be asked to submit to testing for the presen

## (undated) NOTE — LETTER
03/31/22        Isma Lundberg  [address] [facility] [address]      Dear Blas Best,    [address] records indicate that you have outstanding lab work and or testing that was ordered for you and has not yet been completed:  Orders Placed This Encounter      CBC With Differential With Platelet      Comp Metabolic Panel (14)      TSH W Reflex To Free T4      VITAMIN D, 25-HYDROXY [48549][Q]    To provide you with the best possible care, please complete these orders at your earliest convenience. If you have recently completed these orders please disregard this letter. If you have any questions please call the office at Dept: [phone].      Thank you,       Pratima Meléndez MD

## (undated) NOTE — LETTER
Date: 8/31/2020    Patient Name: Anita Rod          To Whom it may concern: This letter has been written at the patient's request. The above patient was seen at the [facility] for treatment of a medical condition.     The patient may retu

## (undated) NOTE — Clinical Note
Date: 4/18/2017    Patient Name: Vicky Rain          To Whom it may concern: This letter has been written at the patient's request. The above patient was seen at the [facility] for treatment of a medical condition.     This patient should

## (undated) NOTE — Clinical Note
Date: 3/22/2017    Patient Name: Blanka Camarillo          To Whom it may concern: This letter has been written at the patient's request. The above patient was seen at the [facility] for treatment of a medical condition.     The patient may retu

## (undated) NOTE — LETTER
VACCINE ADMINISTRATION RECORD  PARENT / GUARDIAN APPROVAL  Date: 2022  Vaccine administered to: Sydni Church     : 2005    MRN: [id number]    A copy of the appropriate Centers for Disease Control and Prevention Vaccine Information statement has been provided. I have read or have had explained the information about the diseases and the vaccines listed below. There was an opportunity to ask questions and any questions were answered satisfactorily. I believe that I understand the benefits and risks of the vaccine cited and ask that the vaccine(s) listed below be given to me or to the person named above (for whom I am authorized to make this request). VACCINES ADMINISTERED:  hpv   Meningitis     I have read and hereby agree to be bound by the terms of this agreement as stated above. My signature is valid until revoked by me in writing. This document is signed by Kirstin Beck, relationship: mother on 2022.:                                                                                                                                         Parent / Aggie Sieving                                                Date    Nasreen Hugo served as a witness to authentication that the identity of the person signing electronically is in fact the person represented as signing. This document was generated by Nasreen Hugo on 2022.

## (undated) NOTE — LETTER
[facility] of Child Health Examination       Student's Name  Rudi Neri Birth Date Title                           Date     Signature HEALTH HISTORY          TO BE COMPLETED AND SIGNED BY PARENT/GUARDIAN AND VERIFIED BY HEALTH CARE PROVIDER    ALLERGIES  (Food, drug, insect, other)  Peanuts [Peanut Oil];  Shellfish-Derived Products; Tree Nuts MEDICATION  (List all prescribed or taken on a 19.80 kg/m²     DIABETES SCREENING  BMI>85% age/sex  No And any two of the following:  Family History No    Ethnic Minority  No          Signs of Insulin Resistance (hypertension, dyslipidemia, polycystic ovarian syndrome, acanthosis nigricans)    No Currently Prescribed Asthma Medication:            Quick-relief  medication (e.g. Short Acting Beta Antagonist): No          Controller medication (e.g. inhaled corticosteroid):   No Other   NEEDS/MODIFICATIONS required in the school setting  None DIET

## (undated) NOTE — MR AVS SNAPSHOT
[facility] Utilities  [address] [facility] [place], [facility] [place] [phone]               Thank you for choosing us for your health care visit with Katya Hung DO.   We are glad to serve you and happy to Use cold and heat  Cold reduces swelling. Both cold and heat reduce pain. Heat should not be used in the initial treatment of the injury. When using cold or heat, always place a towel between the pack and your skin.   · Apply ice or a cold pack 10 to 15 min *Growth percentiles are based on CDC 2-20 Years data     BP percentiles are based on 2000 NHANES data         Current Medications      Notice  As of 3/22/2017 10:40 AM    You have not been prescribed any medications.             Tonio     Sign up for [facility] o cooking healthy meals together  o creating a rainbow shopping list to find colorful fruits and vegetables  o go on a walking scavenger hunt through the neighborhood   o grow a family garden    In addition to 5, 4, 3, 2, 1 families can make small changes

## (undated) NOTE — LETTER
06/21/18        Pérez Byers  [address] [facility] [address]      Dear Federico Boxer,    [address] [facility] records indicate that you have outstanding lab work and or testing that was ordered for you and has not yet been completed:          [facility] ([address] [facility]

## (undated) NOTE — LETTER
Name:  Ricardo Mcwilliams School Year:   Class: Student ID No.:   Address:  [address] [place] [address] Phone:  [phone] (home)  :  14year old   Name Relationship Lgl [address] Work Phone Home Phone Mobile Phone   1.  Gladis Brown syndrome, arrhythmogenic right ventricular cardiomyopathy, long QT syndrome, short QT syndrome, Brugada syndrome, or catecholaminergic polymorphic ventricular tachycardia? No   15.  Does anyone in your family have a heart problem, pacemaker, or implanted de 35. Have you ever had a hit or blow to the head that caused confusion, prolonged headache, or memory problems? No   36. Do you have a history of seizure disorder? No   37. Do you have headaches with exercise? No   38.  Have you ever had numbness, tingling, based on CDC (Girls, 2-20 Years) BMI-for-age based on BMI available as of 7/5/2019. female    Vision: R 20/20          L 20/20          BOTH 20/20          Uncorrected   MEDICAL NORMAL ABNORMAL FINDINGS   Appearance:  Marfan stigmata (kyphoscoliosis, high- [facility] Substance Testing Policy Consent to Random Testing   (This section for high school students only)   1619-7205 school term    As a prerequisite to participation in [employer] activities, we agree that I/our student will not use performance-enhancing

## (undated) NOTE — LETTER
Name:  Thaddeus Godfrey School Year:  4923-0599 Class: Student ID No.:   Address:  [address] [place] [address] Phone:  [phone] (home)  :  14year old   Name Relationship [address] Work Phone Home Phone Mobile Phone   1.  Shreya Kan unexplained car accident, or sudden infant death syndrome)? No   14.  Does anyone in your family have hypertrophic cardiomyopathy, Marfan syndrome, arrhythmogenic right ventricular cardiomyopathy, long QT syndrome, short QT syndrome, Brugada syndrome, or ca 35. Have you had a herpes or MRSA skin infection? No   34. Have you ever had a head injury or concussion? No   35. Have you ever had a hit or blow to the head that caused confusion, prolonged headache, or memory problems? No   36.  Do you have a history of 63\"   Wt 103 lb 4 oz   LMP 07/10/2018   SpO2 98%   BMI 18.29 kg/m²  38 %ile (Z= -0.32) based on CDC 2-20 Years BMI-for-age data using vitals from 7/20/2018. female    Vision: R 20/15          L 20/15          BOTH 20/15          Uncorrected   MEDICAL NORM [de-identified] Assistants or Advanced Nurse Practitioners to sign off on physicals.    [facility] Substance Testing Policy Consent to Random Testing   (This section for high school students only)   5331-4254 school term    As a prerequisite to participation in [employer]

## (undated) NOTE — LETTER
[facility] [employer] of Child Health Examination       Student's Name  Illene Phoenix Birth Date Title                           Date     Signature HEALTH HISTORY          TO BE COMPLETED AND SIGNED BY PARENT/GUARDIAN AND VERIFIED BY HEALTH CARE PROVIDER    ALLERGIES  (Food, drug, insect, other)  Peanuts [Peanut Oil];  Shellfish-Derived Products; Tree Nuts MEDICATION  (List all prescribed or taken on a BP 98/56   Pulse 100   Temp 98.2 °F (36.8 °C) (Oral)   Resp 18   Ht 63.78\"   Wt 110 lb   SpO2 100%   BMI 19.01 kg/m²     DIABETES SCREENING  BMI>85% age/sex  No And any two of the following:  Family History No    Ethnic Minority  No          Signs of Insu Respiratory Yes                   Diagnosis of Asthma: No Mental Health Yes        Currently Prescribed Asthma Medication:            Quick-relief  medication (e.g. Short Acting Beta Antagonist): No          Controller medication (e.g. inhaled corticostero

## (undated) NOTE — LETTER
[facility] [employer] Child Health Examination       Student's Name  Yaw Ramos Birth Date Title                           Date     Signature HEALTH HISTORY          TO BE COMPLETED AND SIGNED BY PARENT/GUARDIAN AND VERIFIED BY HEALTH CARE PROVIDER    ALLERGIES  (Food, drug, insect, other)  Peanuts [Peanut Oil];  Shellfish-Derived Products; Tree Nuts MEDICATION  (List all prescribed or taken on a PHYSICAL EXAMINATION REQUIREMENTS    Entire section below to be completed by MD//APN/PA       PHYSICAL EXAMINATION REQUIREMENTS (head circumference if <33 years old):   BP 96/54   Pulse 87   Temp 98.2 °F (36.8 °C) (Oral)   Resp 16   Ht 62\"   Wt 100 lb Nose Yes  Neurological Yes    Throat Yes  Musculoskeletal Yes    Mouth/Dental Yes  Spinal examination Yes    Cardiovascular/HTN Yes  Nutritional status Yes    Respiratory Yes                   Diagnosis of Asthma: No Mental Health Yes        Currently Pres Printed by the [employer]

## (undated) NOTE — LETTER
[facility] [employer] Child Health Examination       Student's Name  Thaddeus Godfrey Birth Date Title                           Date     Signature BE COMPLETED AND SIGNED BY PARENT/GUARDIAN AND VERIFIED BY HEALTH CARE PROVIDER    ALLERGIES  (Food, drug, insect, other)  Peanuts [Peanut Oil], Shellfish-Derived Products, and Tree Nuts MEDICATION  (List all prescribed or taken on a regular basis.)  No cu BMI 20.79 kg/m²     DIABETES SCREENING  BMI>85% age/sex  No And any two of the following:  Family History No    Ethnic Minority  Yes          Signs of Insulin Resistance (hypertension, dyslipidemia, polycystic ovarian syndrome, acanthosis nigricans)    No Quick-relief  medication (e.g. Short Acting Beta Antagonist): No          Controller medication (e.g. inhaled corticosteroid):   No Other   NEEDS/MODIFICATIONS required in the school setting  None DIETARY Needs/Restrictions     None   SPECIAL INSTRUCT

## (undated) NOTE — LETTER
Date & Time: 3/7/2020, 11:21 AM  Patient: Anita Rod  Encounter Provider(s):    Saintclair Rye, APRN       To Whom It May Concern:    Anita Rod was seen and treated in our department on 3/7/2020.  She should not return to school until Fever free for 24

## (undated) NOTE — LETTER
Date: 9/15/2020    Patient Name: Gigi White          To Whom it may concern: This letter has been written at the patient's request. The above patient was seen at the [facility] for treatment of a medical condition.     The patient may retu

## (undated) NOTE — LETTER
Name:  Kim Gentile School Year:   Class: Student ID No.:   Address:  [address] [place] [address] Phone:  [phone] (home)  :  12year old   Name Relationship [address] Work Phone Home Phone Mobile Phone   1.  Neftali Lindsey unexplained car accident, or sudden infant death syndrome)? No   14.  Does anyone in your family have hypertrophic cardiomyopathy, Marfan syndrome, arrhythmogenic right ventricular cardiomyopathy, long QT syndrome, short QT syndrome, Brugada syndrome, or ca 35. Have you had a herpes or MRSA skin infection? No   34. Have you ever had a head injury or concussion? No   35. Have you ever had a hit or blow to the head that caused confusion, prolonged headache, or memory problems? No   36.  Do you have a history of /60   Pulse 89   Temp 98.2 °F (36.8 °C) (Temporal)   Resp 16   Ht 64\"   Wt 115 lb 6 oz (52.3 kg)   LMP 07/26/2020   SpO2 99%   BMI 19.80 kg/m²  43 %ile (Z= -0.17) based on CDC (Girls, 2-20 Years) BMI-for-age based on BMI available as of 8/4/2020.  fe *effective January 2003, the [employer] of Directors approved a recommendation, consistent with the [facility] [employer], that allows [employer] or Advanced Nurse Practitioners to sign off on physicals.    [facility] Substance Testing Policy Consent t Medicine, & [address] [place] [address] [employer]. Permission granted to reprint for noncommercial, educational purposes with acknowledgment.    [id number]

## (undated) NOTE — LETTER
Name:  Ann-Marie Schulte School Year:   Class: Student ID No.:   Address:  [address] [address] Phone:  [phone] (home)  :  11year old   Name Relationship [address] Work Phone Home Phone Mobile Phone   1.  Jason Dupont ventricular cardiomyopathy, long QT syndrome, short QT syndrome, Brugada syndrome, or catecholaminergic polymorphic ventricular tachycardia? No   15. Does anyone in your family have a heart problem, pacemaker, or implanted defibrillator? No   16.  Has anyon headache, or memory problems? No   36. Do you have a history of seizure disorder? No   37. Do you have headaches with exercise? No   38. Have you ever had numbness, tingling, or weakness in your arms or legs after being hit or falling? No   39. Have you tre Vision: R 20/20          L 20/20          BOTH 20/20          Uncorrected   MEDICAL NORMAL ABNORMAL FINDINGS   Appearance:  Marfan stigmata (kyphoscoliosis, high-arched palate, pectus excavatum,      arachnodactyly, arm span > height, hyperlaxity, myopia, As a prerequisite to participation in [facility] [place] athletic activities, we agree that I/our student will not use performance-enhancing substances as defined in the [facility] Performance-Enhancing Substance Testing Program Protocol.  We have reviewed the policy and under